# Patient Record
Sex: MALE | Race: WHITE | NOT HISPANIC OR LATINO | Employment: UNEMPLOYED | URBAN - METROPOLITAN AREA
[De-identification: names, ages, dates, MRNs, and addresses within clinical notes are randomized per-mention and may not be internally consistent; named-entity substitution may affect disease eponyms.]

---

## 2022-09-12 PROBLEM — M86.9 OSTEOMYELITIS OF RIGHT FOOT: Status: ACTIVE | Noted: 2022-09-12

## 2022-09-12 PROBLEM — E11.9 DIABETES: Status: ACTIVE | Noted: 2022-09-12

## 2022-09-15 PROBLEM — D64.9 ANEMIA: Status: ACTIVE | Noted: 2022-09-15

## 2022-09-15 PROBLEM — E11.65 UNCONTROLLED TYPE 2 DIABETES MELLITUS WITH HYPERGLYCEMIA: Status: ACTIVE | Noted: 2022-09-12

## 2022-09-15 PROBLEM — E66.09 CLASS 1 OBESITY DUE TO EXCESS CALORIES WITH SERIOUS COMORBIDITY AND BODY MASS INDEX (BMI) OF 32.0 TO 32.9 IN ADULT: Status: ACTIVE | Noted: 2022-09-15

## 2022-09-15 PROBLEM — I10 ESSENTIAL HYPERTENSION: Status: ACTIVE | Noted: 2022-09-15

## 2022-09-15 PROBLEM — I73.9 PERIPHERAL ARTERIAL DISEASE: Status: ACTIVE | Noted: 2022-09-15

## 2022-09-19 ENCOUNTER — TELEPHONE (OUTPATIENT)
Dept: CARDIOLOGY | Facility: CLINIC | Age: 52
End: 2022-09-19
Payer: MEDICAID

## 2022-09-19 NOTE — TELEPHONE ENCOUNTER
----- Message from Micah Limon sent at 9/19/2022 10:11 AM CDT -----  Contact: [facility]  Type:  Sooner Appointment Request    Caller is requesting a sooner appointment.  Caller declined first available appointment listed below.  Caller will not accept being placed on the waitlist and is requesting a message be sent to doctor.    Name of Caller:  [facility] Case Mgt  When is the first available appointment?  N/a  Symptoms:  Hosp. Follow up  Best Call Back Number:  [phone]   Additional Information:   Discharge 9/19 [facility]

## 2023-01-21 PROBLEM — I70.261 CRITICAL LIMB ISCHEMIA OF RIGHT LOWER EXTREMITY WITH GANGRENE: Status: ACTIVE | Noted: 2023-01-21

## 2023-01-27 PROBLEM — D62 ACUTE BLOOD LOSS ANEMIA: Status: ACTIVE | Noted: 2023-01-27

## 2023-01-28 PROBLEM — N17.9 AKI (ACUTE KIDNEY INJURY): Status: ACTIVE | Noted: 2023-01-28

## 2023-01-30 PROBLEM — Z98.890: Status: ACTIVE | Noted: 2023-01-30

## 2023-02-02 ENCOUNTER — TELEPHONE (OUTPATIENT)
Dept: VASCULAR SURGERY | Facility: CLINIC | Age: 53
End: 2023-02-02
Payer: MEDICAID

## 2023-02-02 NOTE — TELEPHONE ENCOUNTER
----- Message from Nhi Armstrong sent at 2/2/2023 10:56 AM CST -----  Regarding: hospital f/u  Contact: [facility]  Type:  Sooner Appointment Request    Caller is requesting a sooner appointment.  Caller declined first available appointment listed below.  Caller will not accept being placed on the waitlist and is requesting a message be sent to doctor.    Name of Caller:  [facility]  When is the first available appointment?    Symptoms:  [facility] f/u 2/2/23 dx:right foot  bypass   Best Call Back Number:  [phone] (home)     Additional Information:  Please call patient. Hospital states he needs to be seen within 2 weeks. Thanks!

## 2023-02-23 ENCOUNTER — OFFICE VISIT (OUTPATIENT)
Dept: VASCULAR SURGERY | Facility: CLINIC | Age: 53
End: 2023-02-23
Payer: MEDICAID

## 2023-02-23 VITALS
HEIGHT: 76 IN | SYSTOLIC BLOOD PRESSURE: 126 MMHG | DIASTOLIC BLOOD PRESSURE: 77 MMHG | HEART RATE: 79 BPM | BODY MASS INDEX: 32.01 KG/M2

## 2023-02-23 DIAGNOSIS — Z98.890 S/P POPLITEAL-TIBIAL BYPASS: Primary | ICD-10-CM

## 2023-02-23 DIAGNOSIS — I73.9 PVD (PERIPHERAL VASCULAR DISEASE): ICD-10-CM

## 2023-02-23 PROCEDURE — 3078F PR MOST RECENT DIASTOLIC BLOOD PRESSURE < 80 MM HG: ICD-10-PCS | Mod: CPTII,,, | Performed by: THORACIC SURGERY (CARDIOTHORACIC VASCULAR SURGERY)

## 2023-02-23 PROCEDURE — 1159F PR MEDICATION LIST DOCUMENTED IN MEDICAL RECORD: ICD-10-PCS | Mod: CPTII,,, | Performed by: THORACIC SURGERY (CARDIOTHORACIC VASCULAR SURGERY)

## 2023-02-23 PROCEDURE — 3008F PR BODY MASS INDEX (BMI) DOCUMENTED: ICD-10-PCS | Mod: CPTII,,, | Performed by: THORACIC SURGERY (CARDIOTHORACIC VASCULAR SURGERY)

## 2023-02-23 PROCEDURE — 3044F HG A1C LEVEL LT 7.0%: CPT | Mod: CPTII,,, | Performed by: THORACIC SURGERY (CARDIOTHORACIC VASCULAR SURGERY)

## 2023-02-23 PROCEDURE — 99999 PR PBB SHADOW E&M-EST. PATIENT-LVL III: ICD-10-PCS | Mod: PBBFAC,,, | Performed by: THORACIC SURGERY (CARDIOTHORACIC VASCULAR SURGERY)

## 2023-02-23 PROCEDURE — 1160F PR REVIEW ALL MEDS BY PRESCRIBER/CLIN PHARMACIST DOCUMENTED: ICD-10-PCS | Mod: CPTII,,, | Performed by: THORACIC SURGERY (CARDIOTHORACIC VASCULAR SURGERY)

## 2023-02-23 PROCEDURE — 3060F POS MICROALBUMINURIA REV: CPT | Mod: CPTII,,, | Performed by: THORACIC SURGERY (CARDIOTHORACIC VASCULAR SURGERY)

## 2023-02-23 PROCEDURE — 3074F PR MOST RECENT SYSTOLIC BLOOD PRESSURE < 130 MM HG: ICD-10-PCS | Mod: CPTII,,, | Performed by: THORACIC SURGERY (CARDIOTHORACIC VASCULAR SURGERY)

## 2023-02-23 PROCEDURE — 3066F NEPHROPATHY DOC TX: CPT | Mod: CPTII,,, | Performed by: THORACIC SURGERY (CARDIOTHORACIC VASCULAR SURGERY)

## 2023-02-23 PROCEDURE — 3078F DIAST BP <80 MM HG: CPT | Mod: CPTII,,, | Performed by: THORACIC SURGERY (CARDIOTHORACIC VASCULAR SURGERY)

## 2023-02-23 PROCEDURE — 3066F PR DOCUMENTATION OF TREATMENT FOR NEPHROPATHY: ICD-10-PCS | Mod: CPTII,,, | Performed by: THORACIC SURGERY (CARDIOTHORACIC VASCULAR SURGERY)

## 2023-02-23 PROCEDURE — 3074F SYST BP LT 130 MM HG: CPT | Mod: CPTII,,, | Performed by: THORACIC SURGERY (CARDIOTHORACIC VASCULAR SURGERY)

## 2023-02-23 PROCEDURE — 3044F PR MOST RECENT HEMOGLOBIN A1C LEVEL <7.0%: ICD-10-PCS | Mod: CPTII,,, | Performed by: THORACIC SURGERY (CARDIOTHORACIC VASCULAR SURGERY)

## 2023-02-23 PROCEDURE — 3008F BODY MASS INDEX DOCD: CPT | Mod: CPTII,,, | Performed by: THORACIC SURGERY (CARDIOTHORACIC VASCULAR SURGERY)

## 2023-02-23 PROCEDURE — 1159F MED LIST DOCD IN RCRD: CPT | Mod: CPTII,,, | Performed by: THORACIC SURGERY (CARDIOTHORACIC VASCULAR SURGERY)

## 2023-02-23 PROCEDURE — 3060F PR POS MICROALBUMINURIA RESULT DOCUMENTED/REVIEW: ICD-10-PCS | Mod: CPTII,,, | Performed by: THORACIC SURGERY (CARDIOTHORACIC VASCULAR SURGERY)

## 2023-02-23 PROCEDURE — 99024 PR POST-OP FOLLOW-UP VISIT: ICD-10-PCS | Mod: ,,, | Performed by: THORACIC SURGERY (CARDIOTHORACIC VASCULAR SURGERY)

## 2023-02-23 PROCEDURE — 99213 OFFICE O/P EST LOW 20 MIN: CPT | Mod: PBBFAC,PN | Performed by: THORACIC SURGERY (CARDIOTHORACIC VASCULAR SURGERY)

## 2023-02-23 PROCEDURE — 99999 PR PBB SHADOW E&M-EST. PATIENT-LVL III: CPT | Mod: PBBFAC,,, | Performed by: THORACIC SURGERY (CARDIOTHORACIC VASCULAR SURGERY)

## 2023-02-23 PROCEDURE — 1160F RVW MEDS BY RX/DR IN RCRD: CPT | Mod: CPTII,,, | Performed by: THORACIC SURGERY (CARDIOTHORACIC VASCULAR SURGERY)

## 2023-02-23 PROCEDURE — 99024 POSTOP FOLLOW-UP VISIT: CPT | Mod: ,,, | Performed by: THORACIC SURGERY (CARDIOTHORACIC VASCULAR SURGERY)

## 2023-02-23 RX ORDER — CLOPIDOGREL BISULFATE 75 MG/1
75 TABLET ORAL DAILY
Qty: 60 TABLET | Refills: 1
Start: 2023-02-23 | End: 2024-03-07

## 2023-02-23 NOTE — PROGRESS NOTES
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  He has done reasonably well and has regular wound care for the right great toe wound.    Medicines are noted and are part of the epic record.  He does take daily aspirin.  He is not smoking.    On exam vital signs are stable.  Surgical wounds are clean and dry without evidence of infection.  Perfusion to the right foot is satisfactory with excellent capillary refill and healing of the right great toe debridement.  His clips and sutures were removed in the office.    Recommendation is for an arterial ultrasound of the right leg in 4-6 weeks.  He should take Plavix in addition to aspirin and I have written a prescription for Plavix.

## 2023-05-01 PROBLEM — N17.9 AKI (ACUTE KIDNEY INJURY): Status: RESOLVED | Noted: 2023-01-28 | Resolved: 2023-05-01

## 2023-06-16 ENCOUNTER — TELEPHONE (OUTPATIENT)
Dept: CARDIOLOGY | Facility: CLINIC | Age: 53
End: 2023-06-16
Payer: MEDICAID

## 2023-06-16 NOTE — TELEPHONE ENCOUNTER
----- Message from Haily Colvin MA sent at 6/16/2023 10:38 AM CDT -----  Contact: Patient's Estefania Dalal    ----- Message -----  From: Marylin De La Garza  Sent: 6/16/2023  10:11 AM CDT  To: [facility]    Type:  Needs Medical Advice    Who Called:  Estefania Dalal    Would the patient rather a call back or a response via [facility]? Call back  Best Call Back Number:   [phone]    Additional Information:  States he would like to speak with someone in the office regarding possibly getting a prescription for Viagra - states he would like to get approval from the doctor if he is healthy enough for it - please call to advise/discuss - thank you

## 2023-11-03 ENCOUNTER — TELEPHONE (OUTPATIENT)
Dept: VASCULAR SURGERY | Facility: CLINIC | Age: 53
End: 2023-11-03
Payer: MEDICAID

## 2023-11-03 NOTE — TELEPHONE ENCOUNTER
Pt scheduled for arterial ultrasounds and f/u with Dr Fuller at [place] location    ----- Message from Martinez Bautista sent at 11/3/2023  8:31 AM CDT -----  Regarding: rt call  Type:  Patient Returning Call    Who Called:pt    Who Left Message for Patient:unknown    Does the patient know what this is regarding?:yes    Best Call Back Number:[phone] or [phone]      Additional Information: pt st that he is in a lot of pain, right leg.  Please call to discuss.

## 2023-11-06 ENCOUNTER — HOSPITAL ENCOUNTER (OUTPATIENT)
Dept: RADIOLOGY | Facility: HOSPITAL | Age: 53
Discharge: HOME | End: 2023-11-06
Attending: THORACIC SURGERY (CARDIOTHORACIC VASCULAR SURGERY)
Payer: MEDICAID

## 2023-11-06 DIAGNOSIS — Z98.890 S/P POPLITEAL-TIBIAL BYPASS: ICD-10-CM

## 2023-11-06 DIAGNOSIS — I73.9 PVD (PERIPHERAL VASCULAR DISEASE): ICD-10-CM

## 2023-11-06 PROCEDURE — 93925 LOWER EXTREMITY STUDY: CPT | Mod: TC,PO

## 2023-11-06 PROCEDURE — 93925 US ARTERIAL LOWER EXTREMITY BILAT WITH ABI (XPD): ICD-10-PCS | Mod: 26,,, | Performed by: RADIOLOGY

## 2023-11-06 PROCEDURE — 93925 LOWER EXTREMITY STUDY: CPT | Mod: 26,,, | Performed by: RADIOLOGY

## 2023-11-06 PROCEDURE — 93922 US ARTERIAL LOWER EXTREMITY BILAT WITH ABI (XPD): ICD-10-PCS | Mod: 26,,, | Performed by: RADIOLOGY

## 2023-11-06 PROCEDURE — 93922 UPR/L XTREMITY ART 2 LEVELS: CPT | Mod: 26,,, | Performed by: RADIOLOGY

## 2024-04-25 DIAGNOSIS — Z98.890 S/P POPLITEAL-TIBIAL BYPASS: Primary | ICD-10-CM

## 2024-04-25 DIAGNOSIS — I73.9 PVD (PERIPHERAL VASCULAR DISEASE): ICD-10-CM
